# Patient Record
Sex: FEMALE | URBAN - METROPOLITAN AREA
[De-identification: names, ages, dates, MRNs, and addresses within clinical notes are randomized per-mention and may not be internally consistent; named-entity substitution may affect disease eponyms.]

---

## 2019-04-15 ENCOUNTER — APPOINTMENT (RX ONLY)
Dept: URBAN - METROPOLITAN AREA CLINIC 88 | Facility: CLINIC | Age: 74
Setting detail: DERMATOLOGY
End: 2019-04-15

## 2019-04-15 DIAGNOSIS — R21 RASH AND OTHER NONSPECIFIC SKIN ERUPTION: ICD-10-CM

## 2019-04-15 PROBLEM — F32.9 MAJOR DEPRESSIVE DISORDER, SINGLE EPISODE, UNSPECIFIED: Status: ACTIVE | Noted: 2019-04-15

## 2019-04-15 PROBLEM — K21.9 GASTRO-ESOPHAGEAL REFLUX DISEASE WITHOUT ESOPHAGITIS: Status: ACTIVE | Noted: 2019-04-15

## 2019-04-15 PROBLEM — M12.9 ARTHROPATHY, UNSPECIFIED: Status: ACTIVE | Noted: 2019-04-15

## 2019-04-15 PROCEDURE — ? OTHER

## 2019-04-15 PROCEDURE — ? COUNSELING

## 2019-04-15 PROCEDURE — ? BIOPSY BY PUNCH METHOD

## 2019-04-15 PROCEDURE — 11104 PUNCH BX SKIN SINGLE LESION: CPT

## 2019-04-15 PROCEDURE — ? PRESCRIPTION

## 2019-04-15 ASSESSMENT — LOCATION DETAILED DESCRIPTION DERM: LOCATION DETAILED: RIGHT LATERAL DISTAL CALF

## 2019-04-15 ASSESSMENT — LOCATION ZONE DERM: LOCATION ZONE: LEG

## 2019-04-15 ASSESSMENT — LOCATION SIMPLE DESCRIPTION DERM: LOCATION SIMPLE: RIGHT LOWER LEG

## 2019-04-15 NOTE — PROCEDURE: BIOPSY BY PUNCH METHOD
Hemostasis: None
Detail Level: Detailed
X Depth Of Punch In Cm (Optional): 0
Epidermal Sutures: 4-0 Nylon
Billing Type: Third-Party Bill
Was A Bandage Applied: Yes
Wound Care: Petrolatum
Dressing: bandage
Home Suture Removal Text: Patient was provided a home suture removal kit and will remove their sutures at home.  If they have any questions or difficulties they will call the office.
Patient Will Remove Sutures At Home?: No
Lab: [id number]
Biopsy Type: H and E
Anesthesia Type: 1% lidocaine with epinephrine
Post-Care Instructions: I reviewed with the patient in detail post-care instructions. Patient is to keep the biopsy site dry overnight, and then apply bacitracin twice daily until healed. Patient may apply hydrogen peroxide soaks to remove any crusting.
Punch Size In Mm: 4
Number Of Epidermal Sutures (Optional): 1
Consent: Written consent was obtained and risks were reviewed including but not limited to scarring, infection, bleeding, scabbing, incomplete removal, nerve damage and allergy to anesthesia.
Suture Removal: 14 days
Anesthesia Volume In Cc (Will Not Render If 0): 0.5
Notification Instructions: Patient will be notified of biopsy results. However, patient instructed to call the office if not contacted within 2 weeks.

## 2019-04-15 NOTE — PROCEDURE: OTHER
Detail Level: Zone
Note Text (......Xxx Chief Complaint.): This diagnosis correlates with the
Other (Free Text): Allegra am\\nBenadryl qhs

## 2019-04-17 RX ORDER — CLOBETASOL PROPIONATE 0.5 MG/G
CREAM TOPICAL BID
Qty: 1 | Refills: 2 | Status: ERX | COMMUNITY
Start: 2019-04-17

## 2019-04-17 RX ADMIN — CLOBETASOL PROPIONATE: 0.5 CREAM TOPICAL at 04:41

## 2019-04-29 ENCOUNTER — APPOINTMENT (RX ONLY)
Dept: URBAN - METROPOLITAN AREA CLINIC 88 | Facility: CLINIC | Age: 74
Setting detail: DERMATOLOGY
End: 2019-04-29

## 2019-04-29 DIAGNOSIS — L82.1 OTHER SEBORRHEIC KERATOSIS: ICD-10-CM

## 2019-04-29 DIAGNOSIS — D18.0 HEMANGIOMA: ICD-10-CM

## 2019-04-29 DIAGNOSIS — L50.8 OTHER URTICARIA: ICD-10-CM | Status: RESOLVED

## 2019-04-29 DIAGNOSIS — L72.8 OTHER FOLLICULAR CYSTS OF THE SKIN AND SUBCUTANEOUS TISSUE: ICD-10-CM

## 2019-04-29 PROBLEM — D18.01 HEMANGIOMA OF SKIN AND SUBCUTANEOUS TISSUE: Status: ACTIVE | Noted: 2019-04-29

## 2019-04-29 PROCEDURE — 99213 OFFICE O/P EST LOW 20 MIN: CPT

## 2019-04-29 PROCEDURE — ? COUNSELING

## 2019-04-29 ASSESSMENT — LOCATION ZONE DERM
LOCATION ZONE: LEG
LOCATION ZONE: TRUNK
LOCATION ZONE: TRUNK

## 2019-04-29 ASSESSMENT — LOCATION DETAILED DESCRIPTION DERM
LOCATION DETAILED: SUPERIOR THORACIC SPINE
LOCATION DETAILED: LEFT SUPERIOR UPPER BACK
LOCATION DETAILED: RIGHT DISTAL LATERAL PRETIBIAL REGION
LOCATION DETAILED: INFERIOR THORACIC SPINE

## 2019-04-29 ASSESSMENT — LOCATION SIMPLE DESCRIPTION DERM
LOCATION SIMPLE: LEFT UPPER BACK
LOCATION SIMPLE: UPPER BACK
LOCATION SIMPLE: RIGHT LOWER LEG
LOCATION SIMPLE: UPPER BACK

## 2019-04-29 NOTE — PROCEDURE: MIPS QUALITY
Detail Level: Detailed
Quality 111:Pneumonia Vaccination Status For Older Adults: Pneumococcal Vaccination Previously Received
Quality 110: Preventive Care And Screening: Influenza Immunization: Influenza Immunization previously received during influenza season
Quality 226: Preventive Care And Screening: Tobacco Use: Screening And Cessation Intervention: Patient screened for tobacco use and is an ex/non-smoker